# Patient Record
Sex: MALE | Race: WHITE | Employment: FULL TIME | URBAN - METROPOLITAN AREA
[De-identification: names, ages, dates, MRNs, and addresses within clinical notes are randomized per-mention and may not be internally consistent; named-entity substitution may affect disease eponyms.]

---

## 2022-04-11 ENCOUNTER — HOSPITAL ENCOUNTER (EMERGENCY)
Age: 22
Discharge: HOME | End: 2022-04-12
Attending: EMERGENCY MEDICINE
Payer: COMMERCIAL

## 2022-04-11 ENCOUNTER — APPOINTMENT (OUTPATIENT)
Dept: GENERAL RADIOLOGY | Age: 22
End: 2022-04-11
Attending: EMERGENCY MEDICINE
Payer: COMMERCIAL

## 2022-04-11 DIAGNOSIS — I95.9 HYPOTENSIVE EPISODE: Primary | ICD-10-CM
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
## 2022-04-11 DIAGNOSIS — J98.01 ACUTE BRONCHOSPASM: ICD-10-CM

## 2022-04-11 LAB
ALBUMIN SERPL-MCNC: 3.9 G/DL (ref 3.5–5)
ALBUMIN/GLOB SERPL: 1.1 {RATIO} (ref 1.2–3.5)
ALP SERPL-CCNC: 68 U/L (ref 50–136)
ALT SERPL-CCNC: 30 U/L (ref 12–65)
ANION GAP SERPL CALC-SCNC: 6 MMOL/L (ref 7–16)
AST SERPL-CCNC: 24 U/L (ref 15–37)
BASOPHILS # BLD: 0 K/UL (ref 0–0.2)
BASOPHILS NFR BLD: 0 % (ref 0–2)
BILIRUB SERPL-MCNC: 0.2 MG/DL (ref 0.2–1.1)
BUN SERPL-MCNC: 21 MG/DL (ref 6–23)
CALCIUM SERPL-MCNC: 9.3 MG/DL (ref 8.3–10.4)
CHLORIDE SERPL-SCNC: 103 MMOL/L (ref 98–107)
CO2 SERPL-SCNC: 29 MMOL/L (ref 21–32)
CREAT SERPL-MCNC: 1.8 MG/DL (ref 0.8–1.5)
DIFFERENTIAL METHOD BLD: ABNORMAL
EOSINOPHIL # BLD: 0 K/UL (ref 0–0.8)
EOSINOPHIL NFR BLD: 0 % (ref 0.5–7.8)
ERYTHROCYTE [DISTWIDTH] IN BLOOD BY AUTOMATED COUNT: 12.8 % (ref 11.9–14.6)
GLOBULIN SER CALC-MCNC: 3.4 G/DL (ref 2.3–3.5)
GLUCOSE SERPL-MCNC: 94 MG/DL (ref 65–100)
HCT VFR BLD AUTO: 40.8 % (ref 41.1–50.3)
HGB BLD-MCNC: 14.1 G/DL (ref 13.6–17.2)
IMM GRANULOCYTES # BLD AUTO: 0.1 K/UL (ref 0–0.5)
IMM GRANULOCYTES NFR BLD AUTO: 1 % (ref 0–5)
LYMPHOCYTES # BLD: 2.1 K/UL (ref 0.5–4.6)
LYMPHOCYTES NFR BLD: 13 % (ref 13–44)
MCH RBC QN AUTO: 29.9 PG (ref 26.1–32.9)
MCHC RBC AUTO-ENTMCNC: 34.6 G/DL (ref 31.4–35)
MCV RBC AUTO: 86.4 FL (ref 79.6–97.8)
MONOCYTES # BLD: 1.2 K/UL (ref 0.1–1.3)
MONOCYTES NFR BLD: 8 % (ref 4–12)
NEUTS SEG # BLD: 12.4 K/UL (ref 1.7–8.2)
NEUTS SEG NFR BLD: 78 % (ref 43–78)
NRBC # BLD: 0 K/UL (ref 0–0.2)
PLATELET # BLD AUTO: 377 K/UL (ref 150–450)
PMV BLD AUTO: 9 FL (ref 9.4–12.3)
POTASSIUM SERPL-SCNC: 3.9 MMOL/L (ref 3.5–5.1)
PROT SERPL-MCNC: 7.3 G/DL (ref 6.3–8.2)
RBC # BLD AUTO: 4.72 M/UL (ref 4.23–5.6)
SODIUM SERPL-SCNC: 138 MMOL/L (ref 138–145)
TROPONIN-HIGH SENSITIVITY: 117 PG/ML (ref 0–14)
TROPONIN-HIGH SENSITIVITY: 59.3 PG/ML (ref 0–14)
WBC # BLD AUTO: 15.9 K/UL (ref 4.3–11.1)

## 2022-04-11 PROCEDURE — 99285 EMERGENCY DEPT VISIT HI MDM: CPT

## 2022-04-11 PROCEDURE — 94640 AIRWAY INHALATION TREATMENT: CPT

## 2022-04-11 PROCEDURE — 71046 X-RAY EXAM CHEST 2 VIEWS: CPT

## 2022-04-11 PROCEDURE — 74011250636 HC RX REV CODE- 250/636: Performed by: EMERGENCY MEDICINE

## 2022-04-11 PROCEDURE — 80053 COMPREHEN METABOLIC PANEL: CPT

## 2022-04-11 PROCEDURE — 85025 COMPLETE CBC W/AUTO DIFF WBC: CPT

## 2022-04-11 PROCEDURE — 93005 ELECTROCARDIOGRAM TRACING: CPT | Performed by: EMERGENCY MEDICINE

## 2022-04-11 PROCEDURE — 74011000250 HC RX REV CODE- 250: Performed by: EMERGENCY MEDICINE

## 2022-04-11 PROCEDURE — 96374 THER/PROPH/DIAG INJ IV PUSH: CPT

## 2022-04-11 PROCEDURE — 84484 ASSAY OF TROPONIN QUANT: CPT

## 2022-04-11 RX ORDER — ALBUTEROL SULFATE 0.83 MG/ML
2.5 SOLUTION RESPIRATORY (INHALATION)
Status: COMPLETED | OUTPATIENT
Start: 2022-04-11 | End: 2022-04-11

## 2022-04-11 RX ORDER — PREDNISONE 20 MG/1
20 TABLET ORAL 2 TIMES DAILY
Qty: 10 TABLET | Refills: 0 | Status: SHIPPED | OUTPATIENT
Start: 2022-04-11 | End: 2022-04-16

## 2022-04-11 RX ORDER — ALBUTEROL SULFATE 90 UG/1
2 AEROSOL, METERED RESPIRATORY (INHALATION)
Qty: 1 EACH | Refills: 0 | Status: SHIPPED | OUTPATIENT
Start: 2022-04-11

## 2022-04-11 RX ORDER — PREDNISONE 20 MG/1
20 TABLET ORAL 2 TIMES DAILY
Qty: 10 TABLET | Refills: 0 | Status: SHIPPED | OUTPATIENT
Start: 2022-04-11 | End: 2022-04-11 | Stop reason: SDUPTHER

## 2022-04-11 RX ORDER — IPRATROPIUM BROMIDE AND ALBUTEROL SULFATE 2.5; .5 MG/3ML; MG/3ML
3 SOLUTION RESPIRATORY (INHALATION)
Status: COMPLETED | OUTPATIENT
Start: 2022-04-11 | End: 2022-04-11

## 2022-04-11 RX ADMIN — ALBUTEROL SULFATE 2.5 MG: 2.5 SOLUTION RESPIRATORY (INHALATION) at 19:58

## 2022-04-11 RX ADMIN — IPRATROPIUM BROMIDE AND ALBUTEROL SULFATE 3 ML: .5; 3 SOLUTION RESPIRATORY (INHALATION) at 20:50

## 2022-04-11 RX ADMIN — METHYLPREDNISOLONE SODIUM SUCCINATE 125 MG: 125 INJECTION, POWDER, FOR SOLUTION INTRAMUSCULAR; INTRAVENOUS at 20:42

## 2022-04-11 NOTE — ED TRIAGE NOTES
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 Pt became very dizzy and hypotensive. Initial BP 60/30's and 's. Received 1L NS in route and vitals now 104/60 and . Pt reports little bit of chest pain with deep breathing.

## 2022-04-11 NOTE — ED PROVIDER NOTES
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 Pt became very dizzy and hypotensive. Initial BP 60/30's and 's. Received 1L NS in route and vitals now 104/60 and . Pt reports little bit of chest pain with deep breathing. \"    Patient states he gave out while attempting the testing. He laid down in the rig for a while but continued to be nauseous, hypotensive and tachycardic. He complained of some substernal chest tightness, mostly with deep inspiration or cough. Feeling much improved after fluids but still complains of some chest tightness and persistent cough. No recent URI or UTI symptoms. The history is provided by the patient. Hypotension   This is a new problem. The current episode started 1 to 2 hours ago. The problem has been resolved. Pertinent negatives include no confusion, no somnolence, no seizures, no unresponsiveness, no weakness, no agitation, no delusions, no hallucinations, no self-injury, no violence, no tingling and no numbness. Mental status baseline is normal.  His past medical history does not include diabetes, seizures, liver disease, CVA, TIA, AIDS, hypertension, COPD, depression, dementia, psychotropic medication treatment, head trauma or heart disease. Past Medical History:   Diagnosis Date    Abdominal pain 2/2015    upper right quad    Fatty liver     Morbid obesity ([facility] [address] 3/20/15    BMI 36    Psychiatric disorder     depression- no longer taking meds- \"stable\" father states \"He is doing a lot better. \"        No past surgical history on file.       Family History:   Problem Relation Age of Onset    Diabetes Mother     Hypertension Mother     Hypertension Father        Social History     Socioeconomic History    Marital status: SINGLE     Spouse name: Not on file    Number of children: Not on file    Years of education: Not on file    Highest education level: Not on file   Occupational History    Not on file   Tobacco Use    Smoking status: Former Smoker     Packs/day: 0.10     Years: 1.00     Pack years: 0.10     Quit date: 10/1/2014     Years since quittin.5    Smokeless tobacco: Never Used   Substance and Sexual Activity    Alcohol use: No    Drug use: No    Sexual activity: Not on file   Other Topics Concern    Not on file   Social History Narrative    Not on file     Social Determinants of Health     Financial Resource Strain:     Difficulty of Paying Living Expenses: Not on file   Food Insecurity:     Worried About Running Out of Food in the Last Year: Not on file    Sergio of Food in the Last Year: Not on file   Transportation Needs:     Lack of Transportation (Medical): Not on file    Lack of Transportation (Non-Medical): Not on file   Physical Activity:     Days of Exercise per Week: Not on file    Minutes of Exercise per Session: Not on file   Stress:     Feeling of Stress : Not on file   Social Connections:     Frequency of Communication with Friends and Family: Not on file    Frequency of Social Gatherings with Friends and Family: Not on file    Attends Sikh Services: Not on file    Active Member of [address] [place] [address] [employer] or Organizations: Not on file    Attends Club or Organization Meetings: Not on file    Marital Status: Not on file   Intimate Partner Violence:     Fear of Current or Ex-Partner: Not on file    Emotionally Abused: Not on file    Physically Abused: Not on file    Sexually Abused: Not on file   Housing Stability:     Unable to Pay for Housing in the Last Year: Not on file    Number of [place] in the Last Year: Not on file    Unstable Housing in the Last Year: Not on file         ALLERGIES: Patient has no known allergies. Review of Systems   Constitutional: Negative for chills and fever. HENT: Negative for congestion. Respiratory: Positive for chest tightness and shortness of breath. Cardiovascular: Positive for chest pain.  Negative for palpitations and leg swelling. Gastrointestinal: Positive for nausea (Now resolved). Negative for abdominal pain, constipation, diarrhea and vomiting. Neurological: Negative for tingling, seizures, weakness and numbness. Psychiatric/Behavioral: Negative for agitation, confusion, hallucinations and self-injury. All other systems reviewed and are negative. Vitals:    04/11/22 1559 04/11/22 1629 04/11/22 1659 04/11/22 1729   BP: 114/60 107/61 119/73 125/72   Pulse: (!) 112 (!) 103 100 93   Resp: 20 (!) 0 14 19   Temp:       SpO2: 92% 92% 95% 97%   Weight:       Height:                Physical Exam  Vitals and nursing note reviewed. Constitutional:       General: He is not in acute distress. Appearance: He is well-developed. He is obese. HENT:      Head: Normocephalic and atraumatic. Right Ear: External ear normal.      Left Ear: External ear normal.   Eyes:      Extraocular Movements: Extraocular movements intact. Conjunctiva/sclera: Conjunctivae normal.      Pupils: Pupils are equal, round, and reactive to light. Cardiovascular:      Rate and Rhythm: Normal rate and regular rhythm. Heart sounds: Normal heart sounds. No murmur heard. Pulmonary:      Effort: Pulmonary effort is normal.      Breath sounds: Wheezing (Mild diffuse) present. Abdominal:      General: Bowel sounds are normal.      Palpations: Abdomen is soft. Tenderness: There is no abdominal tenderness. Musculoskeletal:         General: Normal range of motion. Cervical back: Normal range of motion and neck supple. Right lower leg: No edema. Left lower leg: No edema. Skin:     General: Skin is warm and dry. Capillary Refill: Capillary refill takes less than 2 seconds. Neurological:      General: No focal deficit present. Mental Status: He is alert and oriented to person, place, and time.    Psychiatric:         Mood and Affect: Mood normal.         Behavior: Behavior normal. MDM  Number of Diagnoses or Management Options  Acute bronchospasm: new and requires workup  Hypotensive episode: new and requires workup  Overexertion, initial encounter: new and requires workup     Amount and/or Complexity of Data Reviewed  Clinical lab tests: reviewed and ordered  Tests in the radiology section of CPT®: reviewed and ordered  Tests in the medicine section of CPT®: reviewed and ordered  Review and summarize past medical records: yes  Discuss the patient with other providers: yes    Risk of Complications, Morbidity, and/or Mortality  Presenting problems: moderate  Diagnostic procedures: moderate  Management options: moderate    Patient Progress  Patient progress: improved    ED Course as of 04/11/22 2207 Mon Apr 11, 2022 2021 ECG interpretation for ECG dated 11 April 2022 8:09 PM: ECG reveals a sinus tachycardia rate of 1 6 bpm, normal VA and QTc intervals and normal axis. Inferior T wave inversions leads III and aVF, no evidence of ectopy. Abnormal ECG. Herlinda Garvey MD   [BB]   2022 On recheck of the patient, chest tightness is improved with first breathing treatment, but he still complains of some cough with white sputum production and some mild chest tightness. On repeat exam of the lungs, patient still has mild diffuse wheezing throughout. We will treat with a DuoNeb and a dose of steroid. [BB]   2022 Troponin-High Sensitivity(!): 59.3 [BB]      ED Course User Index  [BB] Trudy Le MD       Procedures    The patient was observed in the ED. chest tightness much improved with breathing treatments. Or concern over EKG changes as well as elevated troponin, the case was discussed with cardiology (Dr. Erika Lazaro) who reviewed the case and felt the reaction was directly related to the tachycardia and hypotensive episode from overexertion. Patient be discharged home with a albuterol inhaler as well as a short course of steroid.   Instructed follow-up with his family doctor this week and not to return to exercise regimen until cleared by his family doctor. Results Reviewed:      Recent Results (from the past 24 hour(s))   CBC WITH AUTOMATED DIFF    Collection Time: 04/11/22  6:42 PM   Result Value Ref Range    WBC 15.9 (H) 4.3 - 11.1 K/uL    RBC 4.72 4.23 - 5.6 M/uL    HGB 14.1 13.6 - 17.2 g/dL    HCT 40.8 (L) 41.1 - 50.3 %    MCV 86.4 79.6 - 97.8 FL    MCH 29.9 26.1 - 32.9 PG    MCHC 34.6 31.4 - 35.0 g/dL    RDW 12.8 11.9 - 14.6 %    PLATELET 671 335 - 267 K/uL    MPV 9.0 (L) 9.4 - 12.3 FL    ABSOLUTE NRBC 0.00 0.0 - 0.2 K/uL    DF AUTOMATED      NEUTROPHILS 78 43 - 78 %    LYMPHOCYTES 13 13 - 44 %    MONOCYTES 8 4.0 - 12.0 %    EOSINOPHILS 0 (L) 0.5 - 7.8 %    BASOPHILS 0 0.0 - 2.0 %    IMMATURE GRANULOCYTES 1 0.0 - 5.0 %    ABS. NEUTROPHILS 12.4 (H) 1.7 - 8.2 K/UL    ABS. LYMPHOCYTES 2.1 0.5 - 4.6 K/UL    ABS. MONOCYTES 1.2 0.1 - 1.3 K/UL    ABS. EOSINOPHILS 0.0 0.0 - 0.8 K/UL    ABS. BASOPHILS 0.0 0.0 - 0.2 K/UL    ABS. IMM. GRANS. 0.1 0.0 - 0.5 K/UL   METABOLIC PANEL, COMPREHENSIVE    Collection Time: 04/11/22  6:42 PM   Result Value Ref Range    Sodium 138 138 - 145 mmol/L    Potassium 3.9 3.5 - 5.1 mmol/L    Chloride 103 98 - 107 mmol/L    CO2 29 21 - 32 mmol/L    Anion gap 6 (L) 7 - 16 mmol/L    Glucose 94 65 - 100 mg/dL    BUN 21 6 - 23 MG/DL    Creatinine 1.80 (H) 0.8 - 1.5 MG/DL    GFR est AA 60 (L) >60 ml/min/1.73m2    GFR est non-AA 50 (L) >60 ml/min/1.73m2    Calcium 9.3 8.3 - 10.4 MG/DL    Bilirubin, total 0.2 0.2 - 1.1 MG/DL    ALT (SGPT) 30 12 - 65 U/L    AST (SGOT) 24 15 - 37 U/L    Alk.  phosphatase 68 50 - 136 U/L    Protein, total 7.3 6.3 - 8.2 g/dL    Albumin 3.9 3.5 - 5.0 g/dL    Globulin 3.4 2.3 - 3.5 g/dL    A-G Ratio 1.1 (L) 1.2 - 3.5     TROPONIN-HIGH SENSITIVITY    Collection Time: 04/11/22  6:42 PM   Result Value Ref Range    Troponin-High Sensitivity 59.3 (H) 0 - 14 pg/mL   EKG, 12 LEAD, INITIAL    Collection Time: 04/11/22  8:09 PM   Result Value Ref Range Ventricular Rate 106 BPM    Atrial Rate 106 BPM    P-R Interval 170 ms    QRS Duration 105 ms    Q-T Interval 340 ms    QTC Calculation (Bezet) 452 ms    Calculated P Axis 72 degrees    Calculated R Axis 70 degrees    Calculated T Axis -26 degrees    Diagnosis       Sinus tachycardia  Nonspecific T abnormalities, inferior leads     TROPONIN-HIGH SENSITIVITY    Collection Time: 04/11/22  8:49 PM   Result Value Ref Range    Troponin-High Sensitivity 117.0 (HH) 0 - 14 pg/mL   EKG, 12 LEAD, SUBSEQUENT    Collection Time: 04/11/22 10:02 PM   Result Value Ref Range    Ventricular Rate 101 BPM    Atrial Rate 101 BPM    P-R Interval 179 ms    QRS Duration 106 ms    Q-T Interval 336 ms    QTC Calculation (Bezet) 436 ms    Calculated P Axis 69 degrees    Calculated R Axis 69 degrees    Calculated T Axis -31 degrees    Diagnosis       Sinus tachycardia  Abnormal inferior Q waves  Nonspecific T abnormalities, inferior leads  Baseline wander in lead(s) V3         I discussed the results of all labs, procedures, radiographs, and treatments with the patient and available family. Treatment plan is agreed upon and the patient is ready for discharge. All voiced understanding of the discharge plan and medication instructions or changes as appropriate. Questions about treatment in the ED were answered. All were encouraged to return should symptoms worsen or new problems develop.

## 2022-04-11 NOTE — Clinical Note
[address] [facility] EMERGENCY DEPT   [facility]  [facility] [address] [phone]    Work/School Note    Date: 4/11/2022    To Whom It May concern:    Roxanna Aleman was seen and treated today in the emergency room by the following provider(s):  Attending Provider: Asad Núñez MD.      Roxanna Aleman is excused from work/school on 04/11/22 and 04/12/22. He is medically clear to return to work/school on 4/13/2022.        Sincerely,          Krissy Booker MD

## 2022-04-12 VITALS
DIASTOLIC BLOOD PRESSURE: 77 MMHG | WEIGHT: 315 LBS | RESPIRATION RATE: 21 BRPM | SYSTOLIC BLOOD PRESSURE: 130 MMHG | HEIGHT: 74 IN | BODY MASS INDEX: 40.43 KG/M2 | OXYGEN SATURATION: 92 % | TEMPERATURE: 98.3 F | HEART RATE: 96 BPM

## 2022-04-12 LAB
ATRIAL RATE: 101 BPM
ATRIAL RATE: 106 BPM
CALCULATED P AXIS, ECG09: 69 DEGREES
CALCULATED P AXIS, ECG09: 72 DEGREES
CALCULATED R AXIS, ECG10: 69 DEGREES
CALCULATED R AXIS, ECG10: 70 DEGREES
CALCULATED T AXIS, ECG11: -26 DEGREES
CALCULATED T AXIS, ECG11: -31 DEGREES
DIAGNOSIS, 93000: NORMAL
DIAGNOSIS, 93000: NORMAL
P-R INTERVAL, ECG05: 170 MS
P-R INTERVAL, ECG05: 179 MS
Q-T INTERVAL, ECG07: 336 MS
Q-T INTERVAL, ECG07: 340 MS
QRS DURATION, ECG06: 105 MS
QRS DURATION, ECG06: 106 MS
QTC CALCULATION (BEZET), ECG08: 436 MS
QTC CALCULATION (BEZET), ECG08: 452 MS
VENTRICULAR RATE, ECG03: 101 BPM
VENTRICULAR RATE, ECG03: 106 BPM

## 2022-04-12 NOTE — ED NOTES
I have reviewed discharge instructions with the patient. The patient verbalized understanding. Patient left ED via Discharge Method: ambulatory to Home with father  Opportunity for questions and clarification provided. Patient given 2 scripts. To continue your aftercare when you leave the hospital, you may receive an automated call from our care team to check in on how you are doing. This is a free service and part of our promise to provide the best care and service to meet your aftercare needs.  If you have questions, or wish to unsubscribe from this service please call [phone]. Thank you for Choosing our [facility] Emergency Department.